# Patient Record
Sex: MALE | Race: OTHER | HISPANIC OR LATINO | Employment: UNEMPLOYED | URBAN - METROPOLITAN AREA
[De-identification: names, ages, dates, MRNs, and addresses within clinical notes are randomized per-mention and may not be internally consistent; named-entity substitution may affect disease eponyms.]

---

## 2022-08-04 ENCOUNTER — HOSPITAL ENCOUNTER (OUTPATIENT)
Facility: HOSPITAL | Age: 82
Setting detail: OBSERVATION
Discharge: HOME | End: 2022-08-05
Attending: EMERGENCY MEDICINE | Admitting: INTERNAL MEDICINE
Payer: COMMERCIAL

## 2022-08-04 ENCOUNTER — APPOINTMENT (EMERGENCY)
Dept: RADIOLOGY | Facility: HOSPITAL | Age: 82
End: 2022-08-04
Payer: COMMERCIAL

## 2022-08-04 DIAGNOSIS — I16.0 HYPERTENSIVE URGENCY: ICD-10-CM

## 2022-08-04 DIAGNOSIS — R77.8 ELEVATED TROPONIN: ICD-10-CM

## 2022-08-04 DIAGNOSIS — I10 HYPERTENSION: Primary | ICD-10-CM

## 2022-08-04 LAB
ALBUMIN SERPL BCP-MCNC: 3.9 G/DL (ref 3.5–5)
ALP SERPL-CCNC: 94 U/L (ref 46–116)
ALT SERPL W P-5'-P-CCNC: 26 U/L (ref 12–78)
ANION GAP SERPL CALCULATED.3IONS-SCNC: 10 MMOL/L (ref 4–13)
AST SERPL W P-5'-P-CCNC: 19 U/L (ref 5–45)
ATRIAL RATE: 86 BPM
BACTERIA UR QL AUTO: ABNORMAL /HPF
BASOPHILS # BLD AUTO: 0.02 THOUSANDS/ΜL (ref 0–0.1)
BASOPHILS NFR BLD AUTO: 0 % (ref 0–1)
BILIRUB SERPL-MCNC: 0.65 MG/DL (ref 0.2–1)
BILIRUB UR QL STRIP: NEGATIVE
BUN SERPL-MCNC: 16 MG/DL (ref 5–25)
CALCIUM SERPL-MCNC: 9.3 MG/DL (ref 8.3–10.1)
CARDIAC TROPONIN I PNL SERPL HS: 14 NG/L
CHLORIDE SERPL-SCNC: 102 MMOL/L (ref 96–108)
CLARITY UR: CLEAR
CO2 SERPL-SCNC: 27 MMOL/L (ref 21–32)
COLOR UR: YELLOW
CREAT SERPL-MCNC: 0.95 MG/DL (ref 0.6–1.3)
EOSINOPHIL # BLD AUTO: 0.03 THOUSAND/ΜL (ref 0–0.61)
EOSINOPHIL NFR BLD AUTO: 1 % (ref 0–6)
ERYTHROCYTE [DISTWIDTH] IN BLOOD BY AUTOMATED COUNT: 13.2 % (ref 11.6–15.1)
GFR SERPL CREATININE-BSD FRML MDRD: 75 ML/MIN/1.73SQ M
GLUCOSE SERPL-MCNC: 122 MG/DL (ref 65–140)
GLUCOSE UR STRIP-MCNC: NEGATIVE MG/DL
HCT VFR BLD AUTO: 39.3 % (ref 36.5–49.3)
HGB BLD-MCNC: 13.7 G/DL (ref 12–17)
HGB UR QL STRIP.AUTO: ABNORMAL
IMM GRANULOCYTES # BLD AUTO: 0.03 THOUSAND/UL (ref 0–0.2)
IMM GRANULOCYTES NFR BLD AUTO: 1 % (ref 0–2)
KETONES UR STRIP-MCNC: ABNORMAL MG/DL
LEUKOCYTE ESTERASE UR QL STRIP: NEGATIVE
LYMPHOCYTES # BLD AUTO: 1.47 THOUSANDS/ΜL (ref 0.6–4.47)
LYMPHOCYTES NFR BLD AUTO: 26 % (ref 14–44)
MCH RBC QN AUTO: 32.9 PG (ref 26.8–34.3)
MCHC RBC AUTO-ENTMCNC: 34.9 G/DL (ref 31.4–37.4)
MCV RBC AUTO: 95 FL (ref 82–98)
MONOCYTES # BLD AUTO: 0.43 THOUSAND/ΜL (ref 0.17–1.22)
MONOCYTES NFR BLD AUTO: 8 % (ref 4–12)
NEUTROPHILS # BLD AUTO: 3.68 THOUSANDS/ΜL (ref 1.85–7.62)
NEUTS SEG NFR BLD AUTO: 64 % (ref 43–75)
NITRITE UR QL STRIP: NEGATIVE
NON-SQ EPI CELLS URNS QL MICRO: ABNORMAL /HPF
NRBC BLD AUTO-RTO: 0 /100 WBCS
P AXIS: 73 DEGREES
PH UR STRIP.AUTO: 6 [PH] (ref 4.5–8)
PLATELET # BLD AUTO: 177 THOUSANDS/UL (ref 149–390)
PMV BLD AUTO: 11.7 FL (ref 8.9–12.7)
POTASSIUM SERPL-SCNC: 3.8 MMOL/L (ref 3.5–5.3)
PR INTERVAL: 154 MS
PROT SERPL-MCNC: 7.4 G/DL (ref 6.4–8.4)
PROT UR STRIP-MCNC: NEGATIVE MG/DL
QRS AXIS: 76 DEGREES
QRSD INTERVAL: 90 MS
QT INTERVAL: 370 MS
QTC INTERVAL: 442 MS
RBC # BLD AUTO: 4.16 MILLION/UL (ref 3.88–5.62)
RBC #/AREA URNS AUTO: ABNORMAL /HPF
SODIUM SERPL-SCNC: 139 MMOL/L (ref 135–147)
SP GR UR STRIP.AUTO: 1.01 (ref 1–1.03)
T WAVE AXIS: 72 DEGREES
TSH SERPL DL<=0.05 MIU/L-ACNC: 2.95 UIU/ML (ref 0.45–4.5)
UROBILINOGEN UR QL STRIP.AUTO: 0.2 E.U./DL
VENTRICULAR RATE: 86 BPM
WBC # BLD AUTO: 5.66 THOUSAND/UL (ref 4.31–10.16)
WBC #/AREA URNS AUTO: ABNORMAL /HPF

## 2022-08-04 PROCEDURE — 81001 URINALYSIS AUTO W/SCOPE: CPT

## 2022-08-04 PROCEDURE — 71046 X-RAY EXAM CHEST 2 VIEWS: CPT

## 2022-08-04 PROCEDURE — 93010 ELECTROCARDIOGRAM REPORT: CPT | Performed by: STUDENT IN AN ORGANIZED HEALTH CARE EDUCATION/TRAINING PROGRAM

## 2022-08-04 PROCEDURE — 99285 EMERGENCY DEPT VISIT HI MDM: CPT | Performed by: EMERGENCY MEDICINE

## 2022-08-04 PROCEDURE — 84443 ASSAY THYROID STIM HORMONE: CPT | Performed by: EMERGENCY MEDICINE

## 2022-08-04 PROCEDURE — 85025 COMPLETE CBC W/AUTO DIFF WBC: CPT | Performed by: EMERGENCY MEDICINE

## 2022-08-04 PROCEDURE — 96374 THER/PROPH/DIAG INJ IV PUSH: CPT

## 2022-08-04 PROCEDURE — 84484 ASSAY OF TROPONIN QUANT: CPT | Performed by: EMERGENCY MEDICINE

## 2022-08-04 PROCEDURE — 36415 COLL VENOUS BLD VENIPUNCTURE: CPT | Performed by: EMERGENCY MEDICINE

## 2022-08-04 PROCEDURE — 93005 ELECTROCARDIOGRAM TRACING: CPT

## 2022-08-04 PROCEDURE — 99284 EMERGENCY DEPT VISIT MOD MDM: CPT

## 2022-08-04 PROCEDURE — 80053 COMPREHEN METABOLIC PANEL: CPT | Performed by: EMERGENCY MEDICINE

## 2022-08-04 RX ORDER — AMLODIPINE BESYLATE 5 MG/1
5 TABLET ORAL DAILY
Qty: 30 TABLET | Refills: 0 | Status: SHIPPED | OUTPATIENT
Start: 2022-08-04 | End: 2022-08-05 | Stop reason: SDUPTHER

## 2022-08-04 RX ORDER — LABETALOL HYDROCHLORIDE 5 MG/ML
10 INJECTION, SOLUTION INTRAVENOUS ONCE
Status: COMPLETED | OUTPATIENT
Start: 2022-08-04 | End: 2022-08-04

## 2022-08-04 RX ADMIN — LABETALOL HYDROCHLORIDE 10 MG: 5 INJECTION, SOLUTION INTRAVENOUS at 21:41

## 2022-08-04 NOTE — Clinical Note
Case was discussed with DES and the patient's admission status was agreed to be Admission Status: observation status to the service of Dr Kevin Interiano

## 2022-08-05 VITALS
RESPIRATION RATE: 18 BRPM | DIASTOLIC BLOOD PRESSURE: 63 MMHG | HEART RATE: 62 BPM | OXYGEN SATURATION: 100 % | SYSTOLIC BLOOD PRESSURE: 142 MMHG | TEMPERATURE: 98.1 F

## 2022-08-05 PROBLEM — R77.8 ELEVATED TROPONIN: Status: ACTIVE | Noted: 2022-08-05

## 2022-08-05 PROBLEM — I16.0 HYPERTENSIVE URGENCY: Status: ACTIVE | Noted: 2022-08-05

## 2022-08-05 LAB
2HR DELTA HS TROPONIN: 19 NG/L
4HR DELTA HS TROPONIN: 24 NG/L
ANION GAP SERPL CALCULATED.3IONS-SCNC: 8 MMOL/L (ref 4–13)
ATRIAL RATE: 60 BPM
ATRIAL RATE: 70 BPM
BASOPHILS # BLD AUTO: 0.02 THOUSANDS/ΜL (ref 0–0.1)
BASOPHILS NFR BLD AUTO: 0 % (ref 0–1)
BUN SERPL-MCNC: 16 MG/DL (ref 5–25)
CALCIUM SERPL-MCNC: 8.9 MG/DL (ref 8.3–10.1)
CARDIAC TROPONIN I PNL SERPL HS: 33 NG/L
CARDIAC TROPONIN I PNL SERPL HS: 38 NG/L
CHLORIDE SERPL-SCNC: 104 MMOL/L (ref 96–108)
CHOLEST SERPL-MCNC: 189 MG/DL
CO2 SERPL-SCNC: 27 MMOL/L (ref 21–32)
CREAT SERPL-MCNC: 1.04 MG/DL (ref 0.6–1.3)
EOSINOPHIL # BLD AUTO: 0.04 THOUSAND/ΜL (ref 0–0.61)
EOSINOPHIL NFR BLD AUTO: 1 % (ref 0–6)
ERYTHROCYTE [DISTWIDTH] IN BLOOD BY AUTOMATED COUNT: 13.2 % (ref 11.6–15.1)
EST. AVERAGE GLUCOSE BLD GHB EST-MCNC: 137 MG/DL
GFR SERPL CREATININE-BSD FRML MDRD: 67 ML/MIN/1.73SQ M
GLUCOSE P FAST SERPL-MCNC: 177 MG/DL (ref 65–99)
GLUCOSE SERPL-MCNC: 177 MG/DL (ref 65–140)
HBA1C MFR BLD: 6.4 %
HCT VFR BLD AUTO: 36.1 % (ref 36.5–49.3)
HDLC SERPL-MCNC: 52 MG/DL
HGB BLD-MCNC: 12.8 G/DL (ref 12–17)
IMM GRANULOCYTES # BLD AUTO: 0.01 THOUSAND/UL (ref 0–0.2)
IMM GRANULOCYTES NFR BLD AUTO: 0 % (ref 0–2)
LDLC SERPL CALC-MCNC: 116 MG/DL (ref 0–100)
LYMPHOCYTES # BLD AUTO: 1.42 THOUSANDS/ΜL (ref 0.6–4.47)
LYMPHOCYTES NFR BLD AUTO: 29 % (ref 14–44)
MCH RBC QN AUTO: 33.2 PG (ref 26.8–34.3)
MCHC RBC AUTO-ENTMCNC: 35.5 G/DL (ref 31.4–37.4)
MCV RBC AUTO: 94 FL (ref 82–98)
MONOCYTES # BLD AUTO: 0.48 THOUSAND/ΜL (ref 0.17–1.22)
MONOCYTES NFR BLD AUTO: 10 % (ref 4–12)
NEUTROPHILS # BLD AUTO: 3 THOUSANDS/ΜL (ref 1.85–7.62)
NEUTS SEG NFR BLD AUTO: 60 % (ref 43–75)
NRBC BLD AUTO-RTO: 0 /100 WBCS
P AXIS: 60 DEGREES
P AXIS: 62 DEGREES
PLATELET # BLD AUTO: 171 THOUSANDS/UL (ref 149–390)
PMV BLD AUTO: 11.5 FL (ref 8.9–12.7)
POTASSIUM SERPL-SCNC: 3.7 MMOL/L (ref 3.5–5.3)
PR INTERVAL: 160 MS
PR INTERVAL: 160 MS
QRS AXIS: 66 DEGREES
QRS AXIS: 78 DEGREES
QRSD INTERVAL: 86 MS
QRSD INTERVAL: 90 MS
QT INTERVAL: 392 MS
QT INTERVAL: 428 MS
QTC INTERVAL: 423 MS
QTC INTERVAL: 428 MS
RBC # BLD AUTO: 3.86 MILLION/UL (ref 3.88–5.62)
SODIUM SERPL-SCNC: 139 MMOL/L (ref 135–147)
T WAVE AXIS: 58 DEGREES
T WAVE AXIS: 59 DEGREES
TRIGL SERPL-MCNC: 103 MG/DL
VENTRICULAR RATE: 60 BPM
VENTRICULAR RATE: 70 BPM
WBC # BLD AUTO: 4.97 THOUSAND/UL (ref 4.31–10.16)

## 2022-08-05 PROCEDURE — 83036 HEMOGLOBIN GLYCOSYLATED A1C: CPT | Performed by: NURSE PRACTITIONER

## 2022-08-05 PROCEDURE — 36415 COLL VENOUS BLD VENIPUNCTURE: CPT | Performed by: EMERGENCY MEDICINE

## 2022-08-05 PROCEDURE — 80061 LIPID PANEL: CPT | Performed by: NURSE PRACTITIONER

## 2022-08-05 PROCEDURE — 99236 HOSP IP/OBS SAME DATE HI 85: CPT | Performed by: INTERNAL MEDICINE

## 2022-08-05 PROCEDURE — NC001 PR NO CHARGE: Performed by: EMERGENCY MEDICINE

## 2022-08-05 PROCEDURE — 85025 COMPLETE CBC W/AUTO DIFF WBC: CPT | Performed by: NURSE PRACTITIONER

## 2022-08-05 PROCEDURE — 93010 ELECTROCARDIOGRAM REPORT: CPT | Performed by: STUDENT IN AN ORGANIZED HEALTH CARE EDUCATION/TRAINING PROGRAM

## 2022-08-05 PROCEDURE — 84484 ASSAY OF TROPONIN QUANT: CPT | Performed by: EMERGENCY MEDICINE

## 2022-08-05 PROCEDURE — 93005 ELECTROCARDIOGRAM TRACING: CPT

## 2022-08-05 PROCEDURE — 80048 BASIC METABOLIC PNL TOTAL CA: CPT | Performed by: NURSE PRACTITIONER

## 2022-08-05 RX ORDER — LISINOPRIL 10 MG/1
10 TABLET ORAL DAILY
Qty: 30 TABLET | Refills: 0 | Status: SHIPPED | OUTPATIENT
Start: 2022-08-06 | End: 2022-09-05

## 2022-08-05 RX ORDER — ENOXAPARIN SODIUM 100 MG/ML
40 INJECTION SUBCUTANEOUS DAILY
Status: DISCONTINUED | OUTPATIENT
Start: 2022-08-05 | End: 2022-08-05 | Stop reason: HOSPADM

## 2022-08-05 RX ORDER — AMLODIPINE BESYLATE 5 MG/1
5 TABLET ORAL DAILY
Status: DISCONTINUED | OUTPATIENT
Start: 2022-08-05 | End: 2022-08-05 | Stop reason: HOSPADM

## 2022-08-05 RX ORDER — ONDANSETRON 2 MG/ML
4 INJECTION INTRAMUSCULAR; INTRAVENOUS EVERY 6 HOURS PRN
Status: DISCONTINUED | OUTPATIENT
Start: 2022-08-05 | End: 2022-08-05 | Stop reason: HOSPADM

## 2022-08-05 RX ORDER — LISINOPRIL 10 MG/1
10 TABLET ORAL DAILY
Status: DISCONTINUED | OUTPATIENT
Start: 2022-08-05 | End: 2022-08-05 | Stop reason: HOSPADM

## 2022-08-05 RX ORDER — AMLODIPINE BESYLATE 5 MG/1
5 TABLET ORAL DAILY
Qty: 30 TABLET | Refills: 0 | Status: SHIPPED | OUTPATIENT
Start: 2022-08-05

## 2022-08-05 RX ORDER — MAGNESIUM HYDROXIDE/ALUMINUM HYDROXICE/SIMETHICONE 120; 1200; 1200 MG/30ML; MG/30ML; MG/30ML
30 SUSPENSION ORAL EVERY 6 HOURS PRN
Status: DISCONTINUED | OUTPATIENT
Start: 2022-08-05 | End: 2022-08-05 | Stop reason: HOSPADM

## 2022-08-05 RX ORDER — ACETAMINOPHEN 325 MG/1
650 TABLET ORAL EVERY 6 HOURS PRN
Status: DISCONTINUED | OUTPATIENT
Start: 2022-08-05 | End: 2022-08-05 | Stop reason: HOSPADM

## 2022-08-05 RX ORDER — LABETALOL HYDROCHLORIDE 5 MG/ML
10 INJECTION, SOLUTION INTRAVENOUS EVERY 4 HOURS PRN
Status: DISCONTINUED | OUTPATIENT
Start: 2022-08-05 | End: 2022-08-05 | Stop reason: HOSPADM

## 2022-08-05 RX ADMIN — ENOXAPARIN SODIUM 40 MG: 40 INJECTION SUBCUTANEOUS at 08:36

## 2022-08-05 RX ADMIN — LISINOPRIL 10 MG: 10 TABLET ORAL at 12:40

## 2022-08-05 RX ADMIN — AMLODIPINE BESYLATE 5 MG: 5 TABLET ORAL at 02:17

## 2022-08-05 NOTE — DISCHARGE INSTRUCTIONS
Take medication as prescribed    Call to establish care with a family doctor    Return to ED if any new/worsening symptoms including but not limited to lightheadedness, passing out, chest pain, difficulty breathing, etc

## 2022-08-05 NOTE — ASSESSMENT & PLAN NOTE
· Patient had dental visit today, was noted with elevated blood pressure and advised to go to the emergency room  He went to urgent care, blood pressure >200 so he came to the emergency room  · Blood pressure on arrival 241/105, 220/110    Was given IV labetalol, BP improved to 169/71  · Will plan to discharge on amlodipine 5 mg lisinopril 10 mg  · Blood pressure improved  · Outpatient follow-up with PCP  · Will need repeat BMP in 1 week

## 2022-08-05 NOTE — ED PROVIDER NOTES
History  Chief Complaint   Patient presents with    High Blood Pressure     Pt got a cavity fixed this morning and BP was in 220s  Sent to urgent care and BP was still in 220s and was told to go to ER  +dizziness  Denies cp/sob  [de-identified] yo M with no known PMHx presenting for evaluation of elevated BP and "dizziness"  Divehi speaking, son at bedside providing translation  Pt is from [facility] and does not have PCP, has not seen doctor in several years  Lives with son currently in [address] [place]  Pt was at dentist today and they noted his BP was elevated so he was referred to urgent care who then referred him to ED  Pt reports he feels "dizzy", but can't describe  Does endorse feeling tired  Denies lightheadedness, vertigo, near syncope, CP, SOB  Has never been told he had HTN in the past  No daily meds  Physically active, runs up to 1 hour at a time and no increased exercise intolerance    MDM: [de-identified] yo M with HTN and "dizziness"- cardiac workup/labs to r/o end organ damage          None       History reviewed  No pertinent past medical history  History reviewed  No pertinent surgical history  History reviewed  No pertinent family history  I have reviewed and agree with the history as documented  E-Cigarette/Vaping     E-Cigarette/Vaping Substances          Review of Systems   Constitutional: Negative for chills, fever and unexpected weight change  HENT: Negative for ear pain, rhinorrhea and sore throat  Eyes: Negative for pain and visual disturbance  Respiratory: Negative for cough and shortness of breath  Cardiovascular: Negative for chest pain and leg swelling  Gastrointestinal: Negative for abdominal pain, constipation, diarrhea, nausea and vomiting  Endocrine: Negative for polydipsia, polyphagia and polyuria  Genitourinary: Negative for dysuria, frequency, hematuria and urgency  Musculoskeletal: Negative for back pain, myalgias and neck pain  Skin: Negative for color change and rash  Allergic/Immunologic: Negative for environmental allergies and immunocompromised state  Neurological: Positive for dizziness  Negative for weakness, light-headedness, numbness and headaches  Hematological: Negative for adenopathy  Does not bruise/bleed easily  Psychiatric/Behavioral: Negative for agitation and confusion  All other systems reviewed and are negative  Physical Exam  Physical Exam  Vitals and nursing note reviewed  Constitutional:       General: He is not in acute distress  Appearance: He is well-developed  HENT:      Head: Normocephalic and atraumatic  Nose: Nose normal    Eyes:      Conjunctiva/sclera: Conjunctivae normal    Cardiovascular:      Rate and Rhythm: Normal rate and regular rhythm  Heart sounds: Normal heart sounds  Pulmonary:      Effort: Pulmonary effort is normal  No respiratory distress  Breath sounds: Normal breath sounds  No stridor  No wheezing or rales  Chest:      Chest wall: No tenderness  Abdominal:      General: There is no distension  Palpations: Abdomen is soft  Tenderness: There is no abdominal tenderness  There is no guarding or rebound  Musculoskeletal:         General: No swelling, tenderness or deformity  Cervical back: Normal range of motion and neck supple  Skin:     General: Skin is warm and dry  Findings: No rash  Neurological:      Mental Status: He is alert and oriented to person, place, and time  Motor: No abnormal muscle tone  Coordination: Coordination normal    Psychiatric:         Thought Content:  Thought content normal          Judgment: Judgment normal          Vital Signs  ED Triage Vitals   Temperature Pulse Respirations Blood Pressure SpO2   08/04/22 2007 08/04/22 2005 08/04/22 2005 08/04/22 2005 08/04/22 2005   98 1 °F (36 7 °C) 91 20 (!) 241/105 100 %      Temp Source Heart Rate Source Patient Position - Orthostatic VS BP Location FiO2 (%)   08/04/22 2007 08/04/22 2005 08/04/22 2005 08/04/22 2005 --   Oral Monitor Sitting Right arm       Pain Score       08/05/22 1103       No Pain           Vitals:    08/05/22 0945 08/05/22 1219 08/05/22 1242 08/05/22 1447   BP: 157/72 (!) 186/76 169/79 142/63   Pulse: 88 62 69 62   Patient Position - Orthostatic VS: Lying Lying Lying Lying         Visual Acuity      ED Medications  Medications   labetalol (NORMODYNE) injection 10 mg (10 mg Intravenous Given 8/4/22 2141)       Diagnostic Studies  Results Reviewed     Procedure Component Value Units Date/Time    Hemoglobin A1C [[id number]]  (Abnormal) Collected: 08/05/22 0713    Lab Status: Final result Specimen: Blood from Arm, Left Updated: 08/05/22 1407     Hemoglobin A1C 6 4 %       mg/dl     Lipid Panel with Direct LDL reflex [[id number]]  (Abnormal) Collected: 08/05/22 0713    Lab Status: Final result Specimen: Blood from Arm, Left Updated: 08/05/22 0738     Cholesterol 189 mg/dL      Triglycerides 103 mg/dL      HDL, Direct 52 mg/dL      LDL Calculated 116 mg/dL     Basic metabolic panel [[id number]]  (Abnormal) Collected: 08/05/22 0713    Lab Status: Final result Specimen: Blood from Arm, Left Updated: 08/05/22 0738     Sodium 139 mmol/L      Potassium 3 7 mmol/L      Chloride 104 mmol/L      CO2 27 mmol/L      ANION GAP 8 mmol/L      BUN 16 mg/dL      Creatinine 1 04 mg/dL      Glucose 177 mg/dL      Glucose, Fasting 177 mg/dL      Calcium 8 9 mg/dL      eGFR 67 ml/min/1 73sq m     Narrative:      Enoc guidelines for Chronic Kidney Disease (CKD):     Stage 1 with normal or high GFR (GFR > 90 mL/min/1 73 square meters)    Stage 2 Mild CKD (GFR = 60-89 mL/min/1 73 square meters)    Stage 3A Moderate CKD (GFR = 45-59 mL/min/1 73 square meters)    Stage 3B Moderate CKD (GFR = 30-44 mL/min/1 73 square meters)    Stage 4 Severe CKD (GFR = 15-29 mL/min/1 73 square meters)    Stage 5 End Stage CKD (GFR <15 mL/min/1 73 square meters)  Note: GFR calculation is accurate only with a steady state creatinine    CBC and differential [[id number]]  (Abnormal) Collected: 08/05/22 0713    Lab Status: Final result Specimen: Blood from Arm, Left Updated: 08/05/22 0720     WBC 4 97 Thousand/uL      RBC 3 86 Million/uL      Hemoglobin 12 8 g/dL      Hematocrit 36 1 %      MCV 94 fL      MCH 33 2 pg      MCHC 35 5 g/dL      RDW 13 2 %      MPV 11 5 fL      Platelets 128 Thousands/uL      nRBC 0 /100 WBCs      Neutrophils Relative 60 %      Immat GRANS % 0 %      Lymphocytes Relative 29 %      Monocytes Relative 10 %      Eosinophils Relative 1 %      Basophils Relative 0 %      Neutrophils Absolute 3 00 Thousands/µL      Immature Grans Absolute 0 01 Thousand/uL      Lymphocytes Absolute 1 42 Thousands/µL      Monocytes Absolute 0 48 Thousand/µL      Eosinophils Absolute 0 04 Thousand/µL      Basophils Absolute 0 02 Thousands/µL     HS Troponin I 4hr [[id number]]  (Abnormal) Collected: 08/05/22 0219    Lab Status: Final result Specimen: Blood from Arm, Right Updated: 08/05/22 0358     hs TnI 4hr 38 ng/L      Delta 4hr hsTnI 24 ng/L     HS Troponin I 2hr [[id number]]  (Normal) Collected: 08/05/22 0009    Lab Status: Final result Specimen: Blood from Arm, Right Updated: 08/05/22 0035     hs TnI 2hr 33 ng/L      Delta 2hr hsTnI 19 ng/L     Urine Microscopic [[id number]]  (Abnormal) Collected: 08/04/22 2207    Lab Status: Final result Specimen: Urine, Clean Catch Updated: 08/04/22 2335     RBC, UA 0-1 /hpf      WBC, UA 0-1 /hpf      Epithelial Cells Occasional /hpf      Bacteria, UA None Seen /hpf     TSH, 3rd generation with Free T4 reflex [[id number]]  (Normal) Collected: 08/04/22 2139    Lab Status: Final result Specimen: Blood from Arm, Right Updated: 08/04/22 2228     TSH 3RD GENERATON 2 946 uIU/mL     Narrative:      Patients undergoing fluorescein dye angiography may retain small amounts of fluorescein in the body for 48-72 hours post procedure   Samples containing fluorescein can produce falsely depressed TSH values  If the patient had this procedure,a specimen should be resubmitted post fluorescein clearance        HS Troponin 0hr (reflex protocol) [[id number]]  (Normal) Collected: 08/04/22 2139    Lab Status: Final result Specimen: Blood from Arm, Right Updated: 08/04/22 2216     hs TnI 0hr 14 ng/L     Comprehensive metabolic panel [[id number]] Collected: 08/04/22 2139    Lab Status: Final result Specimen: Blood from Arm, Right Updated: 08/04/22 2212     Sodium 139 mmol/L      Potassium 3 8 mmol/L      Chloride 102 mmol/L      CO2 27 mmol/L      ANION GAP 10 mmol/L      BUN 16 mg/dL      Creatinine 0 95 mg/dL      Glucose 122 mg/dL      Calcium 9 3 mg/dL      AST 19 U/L      ALT 26 U/L      Alkaline Phosphatase 94 U/L      Total Protein 7 4 g/dL      Albumin 3 9 g/dL      Total Bilirubin 0 65 mg/dL      eGFR 75 ml/min/1 73sq m     Narrative:      Enoc guidelines for Chronic Kidney Disease (CKD):     Stage 1 with normal or high GFR (GFR > 90 mL/min/1 73 square meters)    Stage 2 Mild CKD (GFR = 60-89 mL/min/1 73 square meters)    Stage 3A Moderate CKD (GFR = 45-59 mL/min/1 73 square meters)    Stage 3B Moderate CKD (GFR = 30-44 mL/min/1 73 square meters)    Stage 4 Severe CKD (GFR = 15-29 mL/min/1 73 square meters)    Stage 5 End Stage CKD (GFR <15 mL/min/1 73 square meters)  Note: GFR calculation is accurate only with a steady state creatinine    Urine Macroscopic, POC [[id number]]  (Abnormal) Collected: 08/04/22 2207    Lab Status: Final result Specimen: Urine Updated: 08/04/22 2208     Color, UA Yellow     Clarity, UA Clear     pH, UA 6 0     Leukocytes, UA Negative     Nitrite, UA Negative     Protein, UA Negative mg/dl      Glucose, UA Negative mg/dl      Ketones, UA 15 (1+) mg/dl      Urobilinogen, UA 0 2 E U /dl      Bilirubin, UA Negative     Occult Blood, UA Small     Specific [place], UA 1 010    Narrative:      CLINITEK RESULT    CBC and differential [[id number]] Collected: 08/04/22 2139    Lab Status: Final result Specimen: Blood from Arm, Right Updated: 08/04/22 2149     WBC 5 66 Thousand/uL      RBC 4 16 Million/uL      Hemoglobin 13 7 g/dL      Hematocrit 39 3 %      MCV 95 fL      MCH 32 9 pg      MCHC 34 9 g/dL      RDW 13 2 %      MPV 11 7 fL      Platelets 047 Thousands/uL      nRBC 0 /100 WBCs      Neutrophils Relative 64 %      Immat GRANS % 1 %      Lymphocytes Relative 26 %      Monocytes Relative 8 %      Eosinophils Relative 1 %      Basophils Relative 0 %      Neutrophils Absolute 3 68 Thousands/µL      Immature Grans Absolute 0 03 Thousand/uL      Lymphocytes Absolute 1 47 Thousands/µL      Monocytes Absolute 0 43 Thousand/µL      Eosinophils Absolute 0 03 Thousand/µL      Basophils Absolute 0 02 Thousands/µL                  XR chest 2 views   Final Result by Cassandra Patel MD (08/05 6590)      No acute cardiopulmonary disease                    Workstation performed: [id number]                    Procedures  Procedures         ED Course  ED Course as of 08/05/22 2234   u Aug 04, 2022   2147 EKG: NSR @ 86 bpm, normal axis, normal intervals, qtc 442, nonspecific st changes, no prior for comparison   2211 Blood, UA(!): Small   2220 hs TnI 0hr: 14   2316 Pt reassessed, asymptomatic, pending delta troponin   2333 CXR interpreted by myself: no acute abn             HEART Risk Score    Flowsheet Row Most Recent Value   Heart Score Risk Calculator    History 0 Filed at: 08/04/2022 2221   ECG 1 Filed at: 08/04/2022 2221   Age 2 Filed at: 08/04/2022 2221   Risk Factors 1 Filed at: 08/04/2022 2221   Troponin 1 Filed at: 08/04/2022 2221   HEART Score 5 Filed at: 08/04/2022 2221                                      MDM  Number of Diagnoses or Management Options  Elevated troponin  Hypertension  Hypertensive urgency  Diagnosis management comments: [de-identified] yo M presenting with elevated BP reading, significantly improved with IV Labetolol  Signed out to Dr Jose Carpenter pending delta troponin, if neg can DC home with outpt resources/ starting on antihypertensives       Amount and/or Complexity of Data Reviewed  Clinical lab tests: ordered and reviewed  Tests in the radiology section of CPT®: ordered and reviewed  Tests in the medicine section of CPT®: ordered and reviewed  Review and summarize past medical records: yes  Independent visualization of images, tracings, or specimens: yes        Disposition  Final diagnoses:   Hypertension   Hypertensive urgency   Elevated troponin     Time reflects when diagnosis was documented in both MDM as applicable and the Disposition within this note     Time User Action Codes Description Comment    8/4/2022 11:49 PM Verla Balder A Add [I10] Hypertension     8/5/2022  1:02 AM Vannessa Gala Add [I16 0] Hypertensive urgency     8/5/2022  1:02 AM Vannessa Gala Add [R77 8] Elevated troponin       ED Disposition     ED Disposition   Discharge    Condition   --    Date/Time   Fri Aug 5, 2022  3:48 PM    [address] discharge to home/self care  Follow-up Information     Follow up With Specialties Details Why Contact Info Additional [address]   [address], [address] [facility] [address]  [address], [address], [address] [place], [place], [address]          Discharge Medication List as of 8/5/2022  3:48 PM      START taking these medications    Details   lisinopril (ZESTRIL) 10 mg tablet Take 1 tablet (10 mg total) by mouth daily, Starting Sat 8/6/2022, Until Mon 9/5/2022, Normal      amLODIPine (NORVASC) 5 mg tablet Take 1 tablet (5 mg total) by mouth daily, Starting Thu 8/4/2022, Print             Outpatient Discharge Orders   Ambulatory Referral to Family Practice   Standing Status: Future Standing Exp   Date: 08/04/23      Discharge Diet     Activity as tolerated       PDMP Review     None          ED Provider  Electronically Signed by           Jared Kolb DO  08/05/22 2210

## 2022-08-05 NOTE — DISCHARGE SUMMARY
[address] [facility]  Discharge- Amaury Madsen 1940, [de-identified] y o  male MRN: [id number]  Unit/Bed#: ED 16 Encounter: [id number]  Primary Care Provider: No primary care provider on file  Date and time admitted to hospital: 8/4/2022  9:11 PM    Admitting Provider:  Reggie Romero DO  Discharge Provider:  Wendy Desouza DO  Admission Date: 8/4/2022       Discharge Date: 08/05/22   LOS: 0  Primary Care Physician at Discharge: No primary care provider on file  None    HOSPITAL COURSE:  Amaury Madsen is a [de-identified] y o  male who presented hypertensive urgency  He had a dental visit where he was noted to have elevated blood pressure  He went to urgent care he was noted to have blood pressure greater than 200 and came to the emergency room  The patient had previously been on hypertensive regimen with enalapril  He discontinue this as he felt like he did not needed  Due to accelerated hypertension the patient was subsequently assigned observation status and started on blood pressure medication observed  The patient denies any chest pain, no other shortness of breath difficulty breathing  A blood pressure improved with additional dual agent therapy with lisinopril and amlodipine  It was discussed that he should follow-up with PCP and case management was consulted to obtain new PCP for follow-up  At the time of discharge patient is tolerating oral diet is without acute complaint and medically optimized for discharge  All questions answered to the patient's satisfaction and he was in agreement with discharge plan      DISCHARGE DIAGNOSES  Elevated troponin  Assessment & Plan  · ED requesting admission due to delta troponin of 19  · Initial troponin 14  Increased to 33  Patient denies any chest pain or dyspnea  · EKG negative for acute ischemic changes    NSR rate 86  · No evidence of ACS  · Outpatient follow-up    * Hypertensive urgency  Assessment & Plan  · Patient had dental visit today, was noted with elevated blood pressure and advised to go to the emergency room  He went to urgent care, blood pressure >200 so he came to the emergency room  · Blood pressure on arrival 241/105, 220/110  Was given IV labetalol, BP improved to 169/71  · Will plan to discharge on amlodipine 5 mg lisinopril 10 mg  · Blood pressure improved  · Outpatient follow-up with PCP  · Will need repeat BMP in 1 week      CONSULTING PROVIDERS   None    PROCEDURES PERFORMED  * No surgery found *    RADIOLOGY RESULTS  XR chest 2 views    Result Date: 8/5/2022  Narrative: CHEST INDICATION:   HTN  COMPARISON:  None EXAM PERFORMED/VIEWS:  XR CHEST PA & LATERAL Images: 4 FINDINGS: Elevation right hemidiaphragm Cardiomediastinal silhouette appears unremarkable  The lungs are clear  No pneumothorax or pleural effusion  Osseous structures appear within normal limits for patient age  Impression: No acute cardiopulmonary disease   Workstation performed: [id number]       LABS  Results from last 7 days   Lab Units 08/05/22  0713 08/04/22  2139   WBC Thousand/uL 4 97 5 66   HEMOGLOBIN g/dL 12 8 13 7   HEMATOCRIT % 36 1* 39 3   MCV fL 94 95   PLATELETS Thousands/uL 171 177     Results from last 7 days   Lab Units 08/05/22  0713 08/04/22  2139   SODIUM mmol/L 139 139   POTASSIUM mmol/L 3 7 3 8   CHLORIDE mmol/L 104 102   CO2 mmol/L 27 27   BUN mg/dL 16 16   CREATININE mg/dL 1 04 0 95   CALCIUM mg/dL 8 9 9 3   ALBUMIN g/dL  --  3 9   TOTAL BILIRUBIN mg/dL  --  0 65   ALK PHOS U/L  --  94   ALT U/L  --  26   AST U/L  --  19   EGFR ml/min/1 73sq m 67 75   GLUCOSE RANDOM mg/dL 177* 122     Results from last 7 days   Lab Units 08/05/22  0219 08/05/22  0009 08/04/22  2139   HS TNI 0HR ng/L  --   --  14   HS TNI 2HR ng/L  --  33  --    HS TNI 4HR ng/L 38  --   --                   Results from last 7 days   Lab Units 08/05/22  0713   HEMOGLOBIN A1C % 6 4*     Results from last 7 days   Lab Units 08/04/22  2139   TSH 3RD GENERATON uIU/mL 2 946         Results from last 7 days   Lab Units 08/05/22  0713   TRIGLYCERIDES mg/dL 103   CHOLESTEROL mg/dL 189   LDL CALC mg/dL 116*   HDL mg/dL 52       Cultures:   Results from last 7 days   Lab Units 08/04/22  2207   COLOR UA  Yellow   CLARITY UA  Clear   SPEC GRAV UA  1 010   PH UA  6 0   LEUKOCYTES UA  Negative   NITRITE UA  Negative   GLUCOSE UA mg/dl Negative   KETONES UA mg/dl 15 (1+)*   BILIRUBIN UA  Negative   BLOOD UA  Small*      Results from last 7 days   Lab Units 08/04/22 2207   RBC UA /hpf 0-1*   WBC UA /hpf 0-1*   EPITHELIAL CELLS WET PREP /hpf Occasional   BACTERIA UA /hpf None Seen            PHYSICAL EXAM:  Vitals:   Blood Pressure: 142/63 (08/05/22 1447)  Pulse: 62 (08/05/22 1447)  Temperature: 98 1 °F (36 7 °C) (08/04/22 2007)  Temp Source: Oral (08/04/22 2007)  Respirations: 18 (08/05/22 1447)  SpO2: 100 % (08/05/22 1447)      General: well appearing, no acute distress  HEENT: atraumatic, PERRLA, moist mucosa, normal pharynx, normal tonsils and adenoids, normal tongue, no fluid in sinuses  Neck: Trachea midline, no carotid bruit, no masses  Respiratory: normal chest wall expansion, CTA B, no r/r/w, no rubs  Cardiovascular: RRR, no m/r/g, Normal S1 and S2  Abdomen: Soft, non-tender, non-distended, normal bowel sounds in all quadrants, no hepatosplenomegaly, no tympany  Rectal: deferred  Musculoskeletal: normal ROM in upper and lower extremities  Integumentary: warm, dry, and pink, with no rash, purpura, or petechia  Heme/Lymph: no lymphadenopathy, no bruises  Neurological: Cranial Nerves II-XII grossly intact, no tics, normal sensation to pressure and light touch  Psychiatric: cooperative with normal mood, affect, and cognition      Discharge Disposition: Final discharge disposition not confirmed      Test Results Pending at Discharge:           Medications   · Summary of Medication Adjustments made as a result of this hospitalization:  Amlodipine 5 mg, lisinopril 10 mg  · Medication Dosing Tapers - Please refer to Discharge Medication List for details on any medication dosing tapers (if applicable to patient)  · Discharge Medication List: See after visit summary for reconciled discharge medications  Diet restrictions:         Diet Orders   (From admission, onward)             Start     Ordered    08/05/22 0643  Diet Cardiovascular; Cardiac  Diet effective now        References:    Nutrtion Support Algorithm Enteral vs  Parenteral   Question Answer Comment   Diet Type Cardiovascular    Cardiac Cardiac    RD to adjust diet per protocol? Yes        08/05/22 5717              Activity restrictions: No strenuous activity  Discharge Condition: good    Outpatient Follow-Up and Discharge Instructions  See after visit summary section titled Discharge Instructions for information provided to patient and family  Code Status: Level 1 - Full Code  Discharge Statement   I spent 35 minutes discharging the patient  This time was spent on the day of discharge  Greater than 50% of total time was spent with the patient and / or family counseling and / or coordination of care  ** Please Note: This note was completed in part utilizing M-Modal Fluency Direct Software  Grammatical errors, random word insertions, spelling mistakes, and incomplete sentences may be an occasional consequence of this system secondary to software limitations, ambient noise, and hardware issues  If you have any questions or concerns about the content, text, or information contained within the body of this dictation, please contact the provider for clarification  **

## 2022-08-05 NOTE — ASSESSMENT & PLAN NOTE
· ED requesting admission due to delta troponin of 19  · Initial troponin 14  Increased to 33  Patient denies any chest pain or dyspnea  · EKG negative for acute ischemic changes  NSR rate 86  · Cycle troponins and EKG  · Check lipid panel and HgA1c  · Monitor on telemetry  · Outpatient follow-up with Cardiology   Consider inpatient Cardiology consult

## 2022-08-05 NOTE — H&P
51 Sarita Aparicio 1940, [de-identified] y o  male MRN: [id number]  Unit/Bed#: ED 16 Encounter: [id number]  Primary Care Provider: No primary care provider on file  Date and time admitted to hospital: 8/4/2022  9:11 PM    * Hypertensive urgency  Assessment & Plan  · Patient had dental visit today, was noted with elevated blood pressure and advised to go to the emergency room  He went to urgent care, blood pressure >200 so he came to the emergency room  · Blood pressure on arrival 241/105, 220/110  Was given IV labetalol, BP improved to 169/71  · Reports history of hypertension treated with enalapril and 1 other medication  Relocated from [facility] 4 years ago, has been off antihypertensives since  · Start amlodipine 5 mg daily  · Add p r n  IV labetalol  · Close BP monitoring    Elevated troponin  Assessment & Plan  · ED requesting admission due to delta troponin of 19  · Initial troponin 14  Increased to 33  Patient denies any chest pain or dyspnea  · EKG negative for acute ischemic changes  NSR rate 86  · Cycle troponins and EKG  · Check lipid panel and HgA1c  · Monitor on telemetry  · Outpatient follow-up with Cardiology  Consider inpatient Cardiology consult    VTE Prophylaxis: Enoxaparin (Lovenox)  / reason for no mechanical VTE prophylaxis Ambulatory   Code Status:  FC  POLST: POLST is not applicable to this patient  Discussion with family:     Anticipated Length of Stay:  Patient will be admitted on an Observation basis with an anticipated length of stay of  < 2 midnights  Justification for Hospital Stay:  Hypertensive urgency with elevated delta troponin    Total Time for Visit, including Counseling / Coordination of Care: 60 minutes  Greater than 50% of this total time spent on direct patient counseling and coordination of care  Chief Complaint:   Elevated blood pressure    History of Present Illness:    Karyle Reasoner is a [de-identified] y o  Peruvian-speaking male with history of hypertension who presents with c/o elevated blood pressure  States he was at the dentist office this morning and noted with blood pressure over 200, was advised to go to ER  He presented at urgent care, blood pressure elevated over 200, was advised to go to emergency room  Patient reports history of hypertension, treated with enalapril and 1 other medication which he cannot remember  Relocated from [facility] 4 years ago, has been off antihypertensives since  States he exercises daily and walks 2-3 miles; denies alcohol, drug or tobacco use  ROS negative for chest pain or dyspnea  Review of Systems:    Review of Systems   Constitutional: Negative  HENT: Negative  Eyes: Negative for visual disturbance  Respiratory: Negative  Cardiovascular: Negative  Blood pressure elevated   Gastrointestinal: Negative  Genitourinary: Negative  Musculoskeletal: Negative  Skin: Negative  Neurological: Negative  Psychiatric/Behavioral: Negative  Past Medical and Surgical History:     History reviewed  No pertinent past medical history  History reviewed  No pertinent surgical history  Meds/Allergies:    Prior to Admission medications    Medication Sig Start Date End Date Taking? Authorizing Provider   amLODIPine (NORVASC) 5 mg tablet Take 1 tablet (5 mg total) by mouth daily 8/4/22  Yes Crystal Friends, DO     I have reviewed home medications with patient personally  Allergies: No Known Allergies    Social History:     Marital Status:     Occupation: retired  Patient Pre-hospital Living Situation:  Resides with friends or family; moves around  Patient Pre-hospital Level of Mobility:  Ambulatory  Patient Pre-hospital Diet Restrictions:   Substance Use History:   Social History     Substance and Sexual Activity   Alcohol Use None     Social History     Tobacco Use   Smoking Status Not on file   Smokeless Tobacco Not on file     Social History Substance and Sexual Activity   Drug Use Not on file       Family History:    History reviewed  No pertinent family history  Physical Exam:     Vitals:   Blood Pressure: (!) 191/87 (08/05/22 0217)  Pulse: 74 (08/05/22 0115)  Temperature: 98 1 °F (36 7 °C) (08/04/22 2007)  Temp Source: Oral (08/04/22 2007)  Respirations: 16 (08/05/22 0115)  SpO2: 98 % (08/04/22 2245)    Physical Exam  Constitutional:       General: He is not in acute distress  Appearance: Normal appearance  He is not ill-appearing, toxic-appearing or diaphoretic  Comments: Under weight   HENT:      Head: Normocephalic and atraumatic  Nose: No congestion  Mouth/Throat:      Mouth: Mucous membranes are moist    Eyes:      Conjunctiva/sclera: Conjunctivae normal    Cardiovascular:      Rate and Rhythm: Normal rate and regular rhythm  Heart sounds: Normal heart sounds  Pulmonary:      Effort: Pulmonary effort is normal       Breath sounds: Normal breath sounds  Abdominal:      General: Bowel sounds are normal       Palpations: Abdomen is soft  Musculoskeletal:      Right lower leg: No edema  Left lower leg: No edema  Skin:     General: Skin is warm  Neurological:      Mental Status: He is alert and oriented to person, place, and time  Psychiatric:         Mood and Affect: Mood normal          Behavior: Behavior normal          Thought Content: Thought content normal          Judgment: Judgment normal        Additional Data:     Lab Results: I have personally reviewed pertinent reports        Results from last 7 days   Lab Units 08/04/22  2139   WBC Thousand/uL 5 66   HEMOGLOBIN g/dL 13 7   HEMATOCRIT % 39 3   PLATELETS Thousands/uL 177   NEUTROS PCT % 64   LYMPHS PCT % 26   MONOS PCT % 8   EOS PCT % 1     Results from last 7 days   Lab Units 08/04/22  2139   SODIUM mmol/L 139   POTASSIUM mmol/L 3 8   CHLORIDE mmol/L 102   CO2 mmol/L 27   BUN mg/dL 16   CREATININE mg/dL 0 95   ANION GAP mmol/L 10   CALCIUM mg/dL 9 3   ALBUMIN g/dL 3 9   TOTAL BILIRUBIN mg/dL 0 65   ALK PHOS U/L 94   ALT U/L 26   AST U/L 19   GLUCOSE RANDOM mg/dL 122                       Imaging: I have personally reviewed pertinent reports  XR chest 2 views    (Results Pending)       EKG, Pathology, and Other Studies Reviewed on Admission:   · EKG: cxr    Allscripts / Epic Records Reviewed: Yes     ** Please Note: This note has been constructed using a voice recognition system   **

## 2022-08-05 NOTE — CASE MANAGEMENT
Case Management ED Discharge Planning Note    Patient name Amaury Hall [place]  Location ED 16/ED 16 MRN [id number]  : 1940 Date 2022        OBJECTIVE:  Predictive Model Details         11% Factor Value    Risk of Hospital Admission or ED Visit Model Is in Relationship No            Chief Complaint: Hypertensive urgency   Patient Class: Observation  Preferred Pharmacy:   [id number] [facility]  [address]  [place] [address]  Phone: [phone] Fax: [phone]    Primary Care Provider: No primary care provider on file  Primary Insurance: DENISE DOMINIQUE PENDING  Secondary Insurance:     ED Discharge Details:  Pt does not have a PCP and is MA Pending  CM called pt's son, Betzy Childs ([phone])  Betzy Childs is agreeable to [address] [place] [address]  CM left a VM message for the RN to schedule an appointment  CM provided Elliott' phone number on VM message

## 2022-08-05 NOTE — DISCHARGE INSTR - AVS FIRST PAGE
Dear Immanuel Oneil,     It was our pleasure to care for you here at [facility]  It is our hope that we were always able to exceed the expected standards for your care during your stay  You were hospitalized due to hypertensive urgency  You were cared for on the emergency room floor by Cely Champion DO with the [facility] who covers for your primary care physician (PCP), No primary care provider on file  , while you were hospitalized  If you have any questions or concerns related to this hospitalization, you may contact us at [phone]  For follow up as well as any medication refills, we recommend that you follow up with your primary care physician  A registered nurse will reach out to you by phone within a few days after your discharge to answer any additional questions that you may have after going home  However, at this time we provide for you here, the most important instructions / recommendations at discharge:     Notable Medication Adjustments -   Amlodipine 5 mg, lisinopril 10 mg  Testing Required after Discharge -   Repeat BMP with your new PCP  Important follow up information -   Please follow-up with PCP in 1 week  Other Instructions -   Take medications as prescribed  Please review this entire after visit summary as additional general instructions including medication list, appointments, activity, diet, any pertinent wound care, and other additional recommendations from your care team that may be provided for you  Sincerely,     Nick Cabrales DO and Nurse Jeanette VELASQUEZ

## 2022-08-05 NOTE — ASSESSMENT & PLAN NOTE
· ED requesting admission due to delta troponin of 19  · Initial troponin 14  Increased to 33  Patient denies any chest pain or dyspnea  · EKG negative for acute ischemic changes    NSR rate 86  · No evidence of ACS  · Outpatient follow-up

## 2022-08-05 NOTE — ASSESSMENT & PLAN NOTE
· Patient had dental visit today, was noted with elevated blood pressure and advised to go to the emergency room  He went to urgent care, blood pressure >200 so he came to the emergency room  · Blood pressure on arrival 241/105, 220/110  Was given IV labetalol, BP improved to 169/71  · Reports history of hypertension treated with enalapril and 1 other medication    Relocated from [facility] 4 years ago, has been off antihypertensives since  · Start amlodipine 5 mg daily  · Add p r n  IV labetalol  · Close BP monitoring

## 2022-08-05 NOTE — ED PROVIDER NOTES
1:00 AM  Patient was signed out to me earlier  Asked to follow up on delta troponin  Delta troponin is back and is 19  Given his elevated blood pressure and symptoms full have him admitted for observation       Tram Del Valle DO  08/05/22 6609